# Patient Record
Sex: MALE | Race: BLACK OR AFRICAN AMERICAN | URBAN - METROPOLITAN AREA
[De-identification: names, ages, dates, MRNs, and addresses within clinical notes are randomized per-mention and may not be internally consistent; named-entity substitution may affect disease eponyms.]

---

## 2023-02-02 ENCOUNTER — OFFICE VISIT (OUTPATIENT)
Dept: ORTHOPEDIC SURGERY | Age: 68
End: 2023-02-02
Payer: MEDICARE

## 2023-02-02 VITALS — WEIGHT: 302 LBS | HEART RATE: 84 BPM | TEMPERATURE: 97.8 F

## 2023-02-02 DIAGNOSIS — M19.042 ARTHRITIS OF LEFT HAND: ICD-10-CM

## 2023-02-02 DIAGNOSIS — M18.12 ARTHRITIS OF CARPOMETACARPAL (CMC) JOINT OF LEFT THUMB: ICD-10-CM

## 2023-02-02 DIAGNOSIS — S63.502A SPRAIN OF LEFT WRIST, INITIAL ENCOUNTER: Primary | ICD-10-CM

## 2023-02-02 NOTE — PROGRESS NOTES
Patient: Lacey Escobar                MRN: [id number]       SSN: [id number]  YOB: 1955        AGE: 74 y.o. SEX: male      PCP: None  02/02/23    Chief Complaint   Patient presents with    Hand Pain     Left hand       HISTORY:  Lacey Escobar is a 74 y.o. male who is seen for hand pain and swelling. Mr. Vangie Epstein fell down 6 steps at his daughter's house in [facility] in early December. Since then he has been experiencing left hand swelling and pain especially involving the second and third metacarpal phalangeal joints. He has been experiencing left hand stiffness which makes it a somewhat difficult to drive his truck. He was seen at a [place] [address] facility for x-rays 1/5/2023. 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 He lives in [place] with his wife. He has 3 daughters, 1 son and 2 grandchildren. Last 3 Recorded Weights in this Encounter    02/02/23 0906   Weight: 302 lb (137 kg)     There is no height or weight on file to calculate BMI. There is no problem list on file for this patient. REVIEW OF SYSTEMS: All Below are Negative except: See HPI     Constitutional: negative for fever, chills, and weight loss. Cardiovascular: negative for chest pain, claudication, leg swelling, SOB, DENISE   Gastrointestinal: Negative for pain, N/V/C/D, Blood in stool or urine, dysuria,  hematuria, incontinence, pelvic pain. Musculoskeletal: See HPI   Neurological: Negative for dizziness and weakness. Negative for headaches, Visual changes, confusion, seizures   Phychiatric/Behavioral: Negative for depression, memory loss, substance  abuse. Extremities: Negative for hair changes, rash, or skin lesion changes.    Hematologic: Negative for bleeding problems, bruising, pallor or swollen lymph  nodes   Peripheral Vascular: No calf pain, no circulation deficits. Social History     Socioeconomic History    Marital status:      Spouse name: Not on file    Number of children: Not on file    Years of education: Not on file    Highest education level: Not on file   Occupational History    Not on file   Tobacco Use    Smoking status: Never    Smokeless tobacco: Never   Substance and Sexual Activity    Alcohol use: Not on file    Drug use: Not on file    Sexual activity: Not on file   Other Topics Concern    Not on file   Social History Narrative    Not on file     Social Determinants of Health     Financial Resource Strain: Not on file   Food Insecurity: Not on file   Transportation Needs: Not on file   Physical Activity: Not on file   Stress: Not on file   Social Connections: Not on file   Intimate Partner Violence: Not on file   Housing Stability: Not on file        Not on File     No current outpatient medications on file. No current facility-administered medications for this visit. PHYSICAL EXAMINATION:  Visit Vitals  Pulse 84   Temp 97.8 °F (36.6 °C) (Temporal)   Wt 302 lb (137 kg)        ORTHO EXAMINATION:  Examination Right Hand Left Hand   Skin Intact Intact   Deformity - -   Swelling - ++   Tenderness - -   Finger flexion Full Limited index and long   Finger extension Full \"   Sensation Normal Normal   Capillary refill Normal Normal   Heberden's nodes + +   Dupuytren's - -     Examination Left Wrist Right Wrist   Skin Intact Intact   Tenderness - -   Flexion 60 60   Extension 60 60   Deformity - -   Effusion - -   Tinel's sign - -   Phalen's test - -   Finklestein maneuver - -   Pain with thumb abduction - -       RADIOGRAPHS:  See above. IMPRESSION:      ICD-10-CM ICD-9-CM    1. Sprain of left wrist, initial encounter  S63.502A 842.00 REFERRAL TO OCCUPATIONAL THERAPY      REFERRAL TO HAND SURGERY      2. Arthritis of left hand  M19.042 716.94 REFERRAL TO OCCUPATIONAL THERAPY      REFERRAL TO HAND SURGERY      3.  Arthritis of carpometacarpal [place]) joint of left thumb  M18.12 716.94           PLAN: Patient will start a brief course of outpatient occupational hand therapy. I will see him back PRN.     Tara Ramsay MD

## 2023-02-14 DIAGNOSIS — M19.042 ARTHRITIS OF LEFT HAND: ICD-10-CM

## 2023-02-14 DIAGNOSIS — S63.502A SPRAIN OF LEFT WRIST, INITIAL ENCOUNTER: Primary | ICD-10-CM

## 2023-03-24 ENCOUNTER — OFFICE VISIT (OUTPATIENT)
Age: 68
End: 2023-03-24

## 2023-03-24 VITALS — BODY MASS INDEX: 38.3 KG/M2 | TEMPERATURE: 98 F | WEIGHT: 308 LBS | HEIGHT: 75 IN

## 2023-03-24 DIAGNOSIS — M19.042 DEGENERATIVE ARTHRITIS OF METACARPOPHALANGEAL JOINT OF INDEX FINGER OF LEFT HAND: Primary | ICD-10-CM

## 2023-03-24 DIAGNOSIS — M19.042 DEGENERATIVE ARTHRITIS OF METACARPOPHALANGEAL JOINT OF MIDDLE FINGER OF LEFT HAND: ICD-10-CM

## 2023-03-24 PROBLEM — H90.2 CONDUCTIVE HEARING LOSS, MIDDLE EAR: Status: ACTIVE | Noted: 2023-03-24

## 2023-03-24 PROBLEM — Z77.29 EXPOSURE TO GASEOUS SUBSTANCE: Status: ACTIVE | Noted: 2023-03-24

## 2023-03-24 PROBLEM — H52.03 HYPERMETROPIA, BILATERAL: Status: ACTIVE | Noted: 2018-01-11

## 2023-03-24 PROBLEM — H17.822 PERIPHERAL OPACITY OF CORNEA, LEFT EYE: Status: ACTIVE | Noted: 2018-01-11

## 2023-03-24 PROBLEM — H52.4 PRESBYOPIA: Status: ACTIVE | Noted: 2018-01-11

## 2023-03-24 PROBLEM — S46.919A SHOULDER STRAIN: Status: ACTIVE | Noted: 2023-03-24

## 2023-03-24 PROBLEM — H52.209 ASTIGMATISM: Status: ACTIVE | Noted: 2023-03-24

## 2023-03-24 PROBLEM — H17.9 CORNEAL SCAR: Status: ACTIVE | Noted: 2023-03-24

## 2023-03-24 PROBLEM — M1A.00X0 IDIOPATHIC CHRONIC GOUT WITHOUT TOPHUS: Status: ACTIVE | Noted: 2018-05-16

## 2023-03-24 PROBLEM — S43.409A SHOULDER SPRAIN: Status: ACTIVE | Noted: 2023-03-24

## 2023-03-24 RX ORDER — INDOMETHACIN 50 MG/1
CAPSULE ORAL
COMMUNITY
Start: 2023-03-12

## 2023-03-24 RX ORDER — AMLODIPINE AND OLMESARTAN MEDOXOMIL 10; 40 MG/1; MG/1
TABLET ORAL
COMMUNITY
Start: 2023-02-15

## 2023-03-24 RX ORDER — POLYETHYLENE GLYCOL-3350 AND ELECTROLYTES 236; 6.74; 5.86; 2.97; 22.74 G/274.31G; G/274.31G; G/274.31G; G/274.31G; G/274.31G
POWDER, FOR SOLUTION ORAL
COMMUNITY
Start: 2023-02-12

## 2023-03-24 RX ORDER — MECLIZINE HCL 25MG 25 MG/1
25 TABLET, CHEWABLE ORAL 3 TIMES DAILY PRN
COMMUNITY
Start: 2021-12-10

## 2023-03-24 RX ORDER — ATORVASTATIN CALCIUM 20 MG/1
TABLET, FILM COATED ORAL
COMMUNITY
Start: 2023-02-15

## 2023-03-24 NOTE — PROGRESS NOTES
Ingrid Motta is a 74 y.o. male right handed semiretired . Worker's Compensation and legal considerations: none    Chief Complaint   Patient presents with    Hand Pain     Left hand pain     Pain Score:   3    HPI: Patient presents today with a history of left index finger middle finger pain at the bases. He reports that ever since falling a few months prior. Date of onset: December 2022  Injury: Yes: Comment: Fall  Prior Treatment:  No    ROS: Review of Systems - General ROS: negative except HPI    History reviewed. No pertinent past medical history. History reviewed. No pertinent surgical history. Current Outpatient Medications   Medication Sig Dispense Refill    amLODIPine-olmesartan (JUAN) 10-40 MG per tablet       atorvastatin (LIPITOR) 20 MG tablet       indomethacin (INDOCIN) 50 MG capsule       GAVILYTE-G 236 g solution       meclizine (ANTIVERT) 25 MG CHEW Take 25 mg by mouth 3 times daily as needed       No current facility-administered medications for this visit. No Known Allergies      Temp 98 °F (36.7 °C) (Temporal)   Ht 6' 3\" (1.905 m)   Wt (!) 308 lb (139.7 kg)   BMI 38.50 kg/m²   Physical Exam  Vitals and nursing note reviewed. Constitutional:       General: He is not in acute distress. Appearance: Normal appearance. He is not ill-appearing. Cardiovascular:      Pulses: Normal pulses. Pulmonary:      Effort: Pulmonary effort is normal. No respiratory distress. Musculoskeletal:         General: Tenderness present. No swelling, deformity or signs of injury. Cervical back: Normal range of motion and neck supple. Right lower leg: No edema. Left lower leg: No edema. Skin:     General: Skin is warm and dry. Capillary Refill: Capillary refill takes less than 2 seconds. Findings: Erythema present. No bruising. Neurological:      General: No focal deficit present.       Mental Status: He is alert and oriented to person, place, and